# Patient Record
Sex: MALE
[De-identification: names, ages, dates, MRNs, and addresses within clinical notes are randomized per-mention and may not be internally consistent; named-entity substitution may affect disease eponyms.]

---
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
## 2024-01-22 ENCOUNTER — APPOINTMENT (OUTPATIENT)
Dept: PEDIATRIC ORTHOPEDIC SURGERY | Facility: CLINIC | Age: 19
End: 2024-01-22
Payer: COMMERCIAL

## 2024-01-22 VITALS
TEMPERATURE: 97 F | DIASTOLIC BLOOD PRESSURE: 84 MMHG | WEIGHT: 235 LBS | SYSTOLIC BLOOD PRESSURE: 140 MMHG | HEIGHT: 67.6 IN | BODY MASS INDEX: 36.03 KG/M2

## 2024-01-22 DIAGNOSIS — S42.021A DISPLACED FRACTURE OF SHAFT OF RIGHT CLAVICLE, INITIAL ENCOUNTER FOR CLOSED FRACTURE: ICD-10-CM

## 2024-01-22 PROCEDURE — 99212 OFFICE O/P EST SF 10 MIN: CPT

## 2024-01-22 PROCEDURE — 73000 X-RAY EXAM OF COLLAR BONE: CPT | Mod: RT

## 2024-01-22 NOTE — HISTORY OF PRESENT ILLNESS
[FreeTextEntry1] : This 18-year-old returns for follow-up of his right clavicle fracture he has no complaints whatsoever at this time

## 2024-01-22 NOTE — PHYSICAL EXAM
[FreeTextEntry1] : Exam today he has a full range of motion to the right shoulder girdle symmetric to the opposite side.  Strength is excellent he has no tenderness whatsoever over the clavicular fracture site and no gross motion on varus stress.  X-rays ordered and taken today of the right clavicle reveal satisfactory alignment and healing of the fracture